# Patient Record
Sex: FEMALE | Race: WHITE | NOT HISPANIC OR LATINO | Employment: FULL TIME | URBAN - METROPOLITAN AREA
[De-identification: names, ages, dates, MRNs, and addresses within clinical notes are randomized per-mention and may not be internally consistent; named-entity substitution may affect disease eponyms.]

---

## 2024-09-20 ENCOUNTER — OFFICE VISIT (OUTPATIENT)
Dept: PRIMARY CARE | Facility: CLINIC | Age: 34
End: 2024-09-20
Payer: COMMERCIAL

## 2024-09-20 VITALS
DIASTOLIC BLOOD PRESSURE: 60 MMHG | OXYGEN SATURATION: 98 % | HEART RATE: 77 BPM | WEIGHT: 137 LBS | BODY MASS INDEX: 24080.38 KG/M2 | TEMPERATURE: 98.3 F | SYSTOLIC BLOOD PRESSURE: 98 MMHG

## 2024-09-20 DIAGNOSIS — Z23 FLU VACCINE NEED: ICD-10-CM

## 2024-09-20 DIAGNOSIS — F41.9 ANXIETY: Primary | ICD-10-CM

## 2024-09-20 PROCEDURE — 99213 OFFICE O/P EST LOW 20 MIN: CPT | Performed by: FAMILY MEDICINE

## 2024-09-20 PROCEDURE — 90471 IMMUNIZATION ADMIN: CPT | Performed by: FAMILY MEDICINE

## 2024-09-20 PROCEDURE — 1036F TOBACCO NON-USER: CPT | Performed by: FAMILY MEDICINE

## 2024-09-20 PROCEDURE — 90656 IIV3 VACC NO PRSV 0.5 ML IM: CPT | Performed by: FAMILY MEDICINE

## 2024-09-20 RX ORDER — ESCITALOPRAM OXALATE 20 MG/1
20 TABLET ORAL DAILY
Qty: 30 TABLET | Refills: 1 | Status: SHIPPED | OUTPATIENT
Start: 2024-09-20 | End: 2025-03-19

## 2024-09-20 NOTE — PROGRESS NOTES
Subjective   Patient ID: Karen Lorenzo is a 34 y.o. female who presents for Anxiety (EP. Here for anxiety and discuss starting medication. ).  HPI  Very anxious the last 6 mos, agitated easily, yells at children   Lots of trouble sleeping     Have 2 children - ages 6 and 4    Objective   BP 98/60   Pulse 77   Temp 36.8 °C (98.3 °F) (Oral)   Wt 62.1 kg (137 lb)   SpO2 98%   BMI 27952.38 kg/m²        Assessment/Plan   Diagnoses and all orders for this visit:  Anxiety  -     escitalopram (Lexapro) 20 mg tablet; Take 1 tablet (20 mg) by mouth once daily.  Flu vaccine need  -     Flu vaccine, trivalent, preservative free, age 6 months and greater (Fluarix/Fluzone/Flulaval)    Follow up one month to see how she is doing on the Lexapro    Poonam Huang MD  Family Medicine   [facility]

## 2024-10-24 ENCOUNTER — APPOINTMENT (OUTPATIENT)
Dept: PRIMARY CARE | Facility: CLINIC | Age: 34
End: 2024-10-24
Payer: COMMERCIAL

## 2024-10-24 VITALS
TEMPERATURE: 98.7 F | WEIGHT: 137 LBS | HEART RATE: 75 BPM | BODY MASS INDEX: 24080.38 KG/M2 | OXYGEN SATURATION: 98 % | SYSTOLIC BLOOD PRESSURE: 110 MMHG | DIASTOLIC BLOOD PRESSURE: 64 MMHG

## 2024-10-24 DIAGNOSIS — F41.9 ANXIETY: ICD-10-CM

## 2024-10-24 PROCEDURE — 1036F TOBACCO NON-USER: CPT | Performed by: FAMILY MEDICINE

## 2024-10-24 PROCEDURE — 99213 OFFICE O/P EST LOW 20 MIN: CPT | Performed by: FAMILY MEDICINE

## 2024-10-24 RX ORDER — ESCITALOPRAM OXALATE 20 MG/1
20 TABLET ORAL DAILY
Qty: 90 TABLET | Refills: 0 | Status: SHIPPED | OUTPATIENT
Start: 2024-10-24 | End: 2025-04-22

## 2024-10-24 NOTE — PROGRESS NOTES
Subjective   Patient ID: Karen Lorenzo is a 34 y.o. female who presents for Follow-up (EP. Here for folllow up on medication.).  HPI  One month ago started on Lexapro and she reports that has worked well for her- much less anxious and able to react more calmly    Objective   /64 (BP Location: Right arm)   Pulse 75   Temp 37.1 °C (98.7 °F) (Oral)   Wt 62.1 kg (137 lb)   SpO2 98%   BMI 73945.38 kg/m²        Assessment/Plan   Diagnoses and all orders for this visit:  Anxiety  -     escitalopram (Lexapro) 20 mg tablet; Take 1 tablet (20 mg) by mouth once daily.      Poonam Huang MD  Family Medicine   [facility]

## 2024-12-03 ENCOUNTER — TELEPHONE (OUTPATIENT)
Dept: PRIMARY CARE | Facility: CLINIC | Age: 34
End: 2024-12-03
Payer: COMMERCIAL

## 2024-12-03 NOTE — TELEPHONE ENCOUNTER
Spoke w/patient, since she's been on Lexapro she's been tired, hard time staying awake at work. She feels it is affecting work. Is there another medication she can try. Please call & advise [phone]

## 2024-12-04 NOTE — TELEPHONE ENCOUNTER
ML on VM and advised to call me to discuss further.  12-12-24 Patient is calling me back and states that she is sleeping no problem there. She is asking if starting back on it at a lower dose would be an option. If so send to [facility].

## 2024-12-04 NOTE — TELEPHONE ENCOUNTER
I'm not certain the fatigue is from the Lexapro- she was started on Lexapro end of Sept and had been doing well on it; is she sleeping well or tired because of poor sleep?

## 2025-01-15 DIAGNOSIS — F41.9 ANXIETY: ICD-10-CM

## 2025-01-15 RX ORDER — ESCITALOPRAM OXALATE 10 MG/1
10 TABLET ORAL DAILY
Qty: 30 TABLET | Refills: 0 | Status: SHIPPED | OUTPATIENT
Start: 2025-01-15 | End: 2025-07-14

## 2025-01-15 NOTE — TELEPHONE ENCOUNTER
Lexapro #7 needs sent to [facility] to get her till 1-22-25 apt. Patient notified and advised only 7 would be sent in .

## 2025-01-15 NOTE — TELEPHONE ENCOUNTER
Karen called because she had to change her appt. Time on 01/22/25 to the afternoon.     She needs a short supply of:     Lexapro 10 mg   to last until appt. She has been out for 2 days.     Next appt: 01/22/25  Last appt: 10/24/24    Please send to [facility]

## 2025-01-22 ENCOUNTER — APPOINTMENT (OUTPATIENT)
Dept: PRIMARY CARE | Facility: CLINIC | Age: 35
End: 2025-01-22
Payer: COMMERCIAL

## 2025-01-22 ENCOUNTER — OFFICE VISIT (OUTPATIENT)
Dept: PRIMARY CARE | Facility: CLINIC | Age: 35
End: 2025-01-22
Payer: COMMERCIAL

## 2025-01-22 VITALS
DIASTOLIC BLOOD PRESSURE: 60 MMHG | OXYGEN SATURATION: 97 % | HEIGHT: 62 IN | RESPIRATION RATE: 16 BRPM | BODY MASS INDEX: 26.09 KG/M2 | WEIGHT: 141.8 LBS | HEART RATE: 64 BPM | TEMPERATURE: 97.9 F | SYSTOLIC BLOOD PRESSURE: 102 MMHG

## 2025-01-22 DIAGNOSIS — F41.9 ANXIETY: ICD-10-CM

## 2025-01-22 PROCEDURE — 3008F BODY MASS INDEX DOCD: CPT | Performed by: FAMILY MEDICINE

## 2025-01-22 PROCEDURE — 99213 OFFICE O/P EST LOW 20 MIN: CPT | Performed by: FAMILY MEDICINE

## 2025-01-22 PROCEDURE — 1036F TOBACCO NON-USER: CPT | Performed by: FAMILY MEDICINE

## 2025-01-22 RX ORDER — ESCITALOPRAM OXALATE 10 MG/1
10 TABLET ORAL DAILY
Qty: 90 TABLET | Refills: 1 | Status: SHIPPED | OUTPATIENT
Start: 2025-01-22 | End: 2025-07-21

## 2025-01-22 ASSESSMENT — PATIENT HEALTH QUESTIONNAIRE - PHQ9
1. LITTLE INTEREST OR PLEASURE IN DOING THINGS: NOT AT ALL
2. FEELING DOWN, DEPRESSED OR HOPELESS: NOT AT ALL
SUM OF ALL RESPONSES TO PHQ9 QUESTIONS 1 AND 2: 0

## 2025-01-22 NOTE — PROGRESS NOTES
"Subjective   Patient ID: Karen Lorenzo is a 34 y.o. female who presents for Follow-up (anxiety).  HPI  Doing better on the lower dose Lexapro 10 mg- much less tired but still controlling her anxiety     Objective   /60   Pulse 64   Temp 36.6 °C (97.9 °F)   Resp 16   Ht 1.575 m (5' 2\")   Wt 64.3 kg (141 lb 12.8 oz)   SpO2 97%   BMI 25.94 kg/m²    Physical Exam  Alert, well-appearing.    CVS: RRR, no murmurs.     Respiratory: Clear and equal breath sounds.    GI: Soft, nontender.     Assessment/Plan   Diagnoses and all orders for this visit:  Anxiety  -     escitalopram (Lexapro) 10 mg tablet; Take 1 tablet (10 mg) by mouth once daily.      Poonam Huang MD  Family Medicine   [facility]  "

## 2025-01-24 ENCOUNTER — APPOINTMENT (OUTPATIENT)
Dept: PRIMARY CARE | Facility: CLINIC | Age: 35
End: 2025-01-24
Payer: COMMERCIAL

## 2025-02-25 DIAGNOSIS — F41.9 ANXIETY: ICD-10-CM

## 2025-02-25 NOTE — TELEPHONE ENCOUNTER
Pt called ldm requesting Rx refill on   Escitalopram 10 mg   Send to [facility]     Last seen 01/22/25  Scheduled 07/18/25  Last B/w 06/15/22    Pt is completley out.  She is requesting a 90 tablets.

## 2025-02-26 RX ORDER — ESCITALOPRAM OXALATE 10 MG/1
10 TABLET ORAL DAILY
Qty: 90 TABLET | Refills: 1 | Status: SHIPPED | OUTPATIENT
Start: 2025-02-26 | End: 2025-08-25

## 2025-03-18 ENCOUNTER — TELEPHONE (OUTPATIENT)
Dept: PRIMARY CARE | Facility: CLINIC | Age: 35
End: 2025-03-18
Payer: COMMERCIAL

## 2025-03-18 DIAGNOSIS — Z87.898 H/O MOTION SICKNESS: Primary | ICD-10-CM

## 2025-03-18 RX ORDER — SCOPOLAMINE 1 MG/3D
1 PATCH, EXTENDED RELEASE TRANSDERMAL
Qty: 4 PATCH | Refills: 0 | Status: SHIPPED | OUTPATIENT
Start: 2025-03-18 | End: 2025-05-17

## 2025-03-18 NOTE — TELEPHONE ENCOUNTER
Pt called ldm stating she is going on a lloyd and would like something for motion sickness.  She as for a few since she will be gone for more than a week.   Send to [facility].     Last seen  01/22/25  Scheduled  07/18/25  Last B/w 06/15/22

## 2025-07-18 ENCOUNTER — APPOINTMENT (OUTPATIENT)
Dept: PRIMARY CARE | Facility: CLINIC | Age: 35
End: 2025-07-18
Payer: COMMERCIAL

## 2025-07-18 VITALS
HEIGHT: 62 IN | WEIGHT: 146.3 LBS | OXYGEN SATURATION: 97 % | BODY MASS INDEX: 26.92 KG/M2 | DIASTOLIC BLOOD PRESSURE: 58 MMHG | HEART RATE: 60 BPM | SYSTOLIC BLOOD PRESSURE: 100 MMHG | TEMPERATURE: 98.3 F | RESPIRATION RATE: 16 BRPM

## 2025-07-18 DIAGNOSIS — F41.9 ANXIETY: ICD-10-CM

## 2025-07-18 DIAGNOSIS — Z00.00 HEALTH MAINTENANCE EXAMINATION: Primary | ICD-10-CM

## 2025-07-18 PROBLEM — J02.9 ACUTE PHARYNGITIS: Status: ACTIVE | Noted: 2025-07-18

## 2025-07-18 PROBLEM — M25.519 SHOULDER PAIN: Status: ACTIVE | Noted: 2025-07-18

## 2025-07-18 PROBLEM — J01.00 ACUTE NON-RECURRENT MAXILLARY SINUSITIS: Status: ACTIVE | Noted: 2025-07-18

## 2025-07-18 PROBLEM — Z90.49 S/P LAPAROSCOPIC APPENDECTOMY: Status: ACTIVE | Noted: 2022-02-24

## 2025-07-18 PROBLEM — F41.8 POSTPARTUM ANXIETY: Status: ACTIVE | Noted: 2025-07-18

## 2025-07-18 PROBLEM — O34.219 PREVIOUS CESAREAN DELIVERY AFFECTING PREGNANCY, ANTEPARTUM (HHS-HCC): Status: ACTIVE | Noted: 2020-02-03

## 2025-07-18 PROBLEM — K37 APPENDICITIS: Status: ACTIVE | Noted: 2022-02-23

## 2025-07-18 PROBLEM — O43.122 VELAMENTOUS INSERTION OF UMBILICAL CORD IN SECOND TRIMESTER (HHS-HCC): Status: ACTIVE | Noted: 2020-04-21

## 2025-07-18 PROBLEM — R73.03 BORDERLINE DIABETES: Status: ACTIVE | Noted: 2020-07-16

## 2025-07-18 PROBLEM — L03.031 PARONYCHIA OF GREAT TOE OF RIGHT FOOT: Status: ACTIVE | Noted: 2025-07-18

## 2025-07-18 PROBLEM — G47.00 INSOMNIA: Status: ACTIVE | Noted: 2025-07-18

## 2025-07-18 PROBLEM — J06.9 ACUTE URI: Status: ACTIVE | Noted: 2025-07-18

## 2025-07-18 PROCEDURE — 3008F BODY MASS INDEX DOCD: CPT | Performed by: FAMILY MEDICINE

## 2025-07-18 PROCEDURE — 99395 PREV VISIT EST AGE 18-39: CPT | Performed by: FAMILY MEDICINE

## 2025-07-18 RX ORDER — ESCITALOPRAM OXALATE 10 MG/1
10 TABLET ORAL DAILY
Qty: 90 TABLET | Refills: 3 | Status: SHIPPED | OUTPATIENT
Start: 2025-07-18 | End: 2026-01-14

## 2025-07-18 NOTE — PROGRESS NOTES
"Subjective   Patient ID: Karen Lorenzo is a 35 y.o. female who presents for Annual Exam (EPV, WWE - NO PAP/Blood Work completed 6/15/25).    History of Present Illness  Karen Lorenzo is a 35 year old female who presents for an annual physical exam.    She has no specific complaints during the visit.   She is currently taking Lexapro and is doing well on it.    She exercises regularly, attending [employer] classes twice a week, although she has not been able to go for the past two weeks due to a busy schedule. She enjoys the workouts and feels good afterwards.    Review of Systems  General: no fever or night sweats, no change in weight  Eyes: no vision disturbance  ENT: no mouth lesions, no sore throat, and no dysphagia  CV: no chest pain, no palpitations  Resp: no shortness of breath, no cough  GI: no abdominal pain, no change in bowel habits, chronic constipation with small, pellet-like stools   : no urinary problems  MSK: no arthralgias, myalgias, or back pain  Skin; no rashes or new/changed skin lesions  Neuro: no headaches        Objective     /58 (BP Location: Left arm, Patient Position: Sitting)   Pulse 60   Temp 36.8 °C (98.3 °F) (Oral)   Resp 16   Ht 1.575 m (5' 2.01\")   Wt 66.4 kg (146 lb 4.8 oz)   LMP 06/22/2025 (Approximate)   SpO2 97%   BMI 26.75 kg/m²      Physical Exam    Appears well.     HEENT: OP clear. Sclera white. PERRL. EACs and TMs clear.     Neck: supple, no masses, or lymphadenopathy. No thyromegaly.     CVS: RRR. No murmurs.     Lungs: clear with normal inspirations and expirations.    Breasts: Symmetrical, no masses, no skin retraction or dimpling. No nipple discharge. No axillary nodes.    Abd: soft, NT, no masses or HSM.    Recent blood work showed normal results, including blood sugar, kidney function, liver function, cholesterol, thyroid, and blood cell count.  Assessment/Plan     Diagnoses and all orders for this visit:  Health maintenance " examination  Anxiety  -     escitalopram (Lexapro) 10 mg tablet; Take 1 tablet (10 mg) by mouth once daily.    Assessment & Plan  Constipation  despite increased water and fiber intake. Discussed potential benefits of kombucha, Manuka honey, and probiotics.   - Recommend trial of Metamucil   - Consider use of probiotics.  - Encourage continued hydration and fiber intake.    Routine physical examination with normal recent blood work. Up to date with GYN visits and Pap smears.  - Encourage regular exercise, at least twice a week.  - Continue routine health screenings and follow-ups with GYN.    Poonam Huang MD    This medical note was created with the assistance of artificial intelligence (AI) for documentation purposes. The content has been reviewed and confirmed by the healthcare provider for accuracy and completeness. Patient consented to the use of audio recording and use of AI during their visit.